# Patient Record
Sex: FEMALE | Race: WHITE | NOT HISPANIC OR LATINO | Employment: OTHER | URBAN - METROPOLITAN AREA
[De-identification: names, ages, dates, MRNs, and addresses within clinical notes are randomized per-mention and may not be internally consistent; named-entity substitution may affect disease eponyms.]

---

## 2019-09-13 ENCOUNTER — OFFICE VISIT (OUTPATIENT)
Dept: URGENT CARE | Facility: CLINIC | Age: 64
End: 2019-09-13
Payer: MEDICARE

## 2019-09-13 VITALS
SYSTOLIC BLOOD PRESSURE: 136 MMHG | OXYGEN SATURATION: 98 % | TEMPERATURE: 97 F | HEIGHT: 69 IN | HEART RATE: 63 BPM | WEIGHT: 154 LBS | BODY MASS INDEX: 22.81 KG/M2 | DIASTOLIC BLOOD PRESSURE: 87 MMHG

## 2019-09-13 DIAGNOSIS — L08.9 INFECTED BLISTER OF GREAT TOE OF LEFT FOOT, INITIAL ENCOUNTER: Primary | ICD-10-CM

## 2019-09-13 DIAGNOSIS — S90.422A INFECTED BLISTER OF GREAT TOE OF LEFT FOOT, INITIAL ENCOUNTER: Primary | ICD-10-CM

## 2019-09-13 PROCEDURE — 99203 PR OFFICE/OUTPT VISIT, NEW, LEVL III, 30-44 MIN: ICD-10-PCS | Mod: S$GLB,,, | Performed by: NURSE PRACTITIONER

## 2019-09-13 PROCEDURE — 99203 OFFICE O/P NEW LOW 30 MIN: CPT | Mod: S$GLB,,, | Performed by: NURSE PRACTITIONER

## 2019-09-13 RX ORDER — SULFAMETHOXAZOLE AND TRIMETHOPRIM 800; 160 MG/1; MG/1
1 TABLET ORAL 2 TIMES DAILY
Qty: 14 TABLET | Refills: 0 | Status: SHIPPED | OUTPATIENT
Start: 2019-09-13 | End: 2019-09-20

## 2019-09-13 RX ORDER — LAMOTRIGINE 100 MG/1
TABLET ORAL
COMMUNITY
Start: 2019-09-03

## 2019-09-13 RX ORDER — CALCIUM CARBONATE 600 MG
600 TABLET ORAL
COMMUNITY

## 2019-09-13 RX ORDER — TRAZODONE HYDROCHLORIDE 100 MG/1
TABLET ORAL
COMMUNITY
Start: 2018-09-07

## 2019-09-13 RX ORDER — MUPIROCIN 20 MG/G
OINTMENT TOPICAL
Qty: 22 G | Refills: 0 | Status: SHIPPED | OUTPATIENT
Start: 2019-09-13 | End: 2019-09-20

## 2019-09-13 RX ORDER — QUETIAPINE FUMARATE 25 MG/1
TABLET, FILM COATED ORAL
COMMUNITY
Start: 2018-05-18

## 2019-09-13 RX ORDER — METFORMIN HYDROCHLORIDE 500 MG/1
1000 TABLET, EXTENDED RELEASE ORAL
COMMUNITY
Start: 2019-04-23

## 2019-09-13 RX ORDER — MULTIVITAMIN
1 TABLET ORAL
COMMUNITY

## 2019-09-13 RX ORDER — ESTRADIOL 2 MG/1
2 TABLET ORAL
COMMUNITY
Start: 2018-02-12

## 2019-09-13 RX ORDER — CLONAZEPAM 1 MG/1
TABLET ORAL
COMMUNITY
Start: 2018-04-11

## 2019-09-13 RX ORDER — ROSUVASTATIN CALCIUM 10 MG/1
10 TABLET, COATED ORAL
COMMUNITY
Start: 2019-05-29

## 2019-09-14 NOTE — PROGRESS NOTES
"Subjective:       Patient ID: Carrie Palomo is a 64 y.o. female.    Vitals:  height is 5' 9" (1.753 m) and weight is 69.9 kg (154 lb). Her oral temperature is 97.3 °F (36.3 °C). Her blood pressure is 136/87 and her pulse is 63. Her oxygen saturation is 98%.     Chief Complaint: Toe Pain    Patient present with left toe swollen and blistering, patient suspected   After working on her feet for 1 week.. Patient noticed swelling Wednesday afternoon. Patient has not been treating toe with nothing.   Patient reports that the blistering has been worsening over the last week.  Patient is an  and she is working on a ranch for 1 week.  Reports that she was on her feet for extended period of time while renovating the ranch.  Denies any trauma to the associated toe.    Toe Pain    The incident occurred 2 days ago. Incident location: Ranch. The injury mechanism is unknown. The pain is present in the left toes. The pain is at a severity of 4/10. The symptoms are aggravated by weight bearing, palpation and movement. She has tried nothing for the symptoms. The treatment provided no relief.       Constitution: Negative for chills, fatigue and fever.   HENT: Negative for congestion and sore throat.    Neck: Negative for painful lymph nodes.   Cardiovascular: Negative for chest pain and leg swelling.   Eyes: Negative for double vision and blurred vision.   Respiratory: Negative for cough and shortness of breath.    Gastrointestinal: Negative for nausea, vomiting and diarrhea.   Genitourinary: Negative for dysuria, frequency, urgency and history of kidney stones.   Musculoskeletal: Positive for joint swelling. Negative for joint pain, muscle cramps and muscle ache.   Skin: Positive for erythema (  Blister present to the distal left greater toe.  Blisters present to the left lateral toe.  The area is mildly tender to touch.  There is surrounding erythema.  There is no blood present in the serum of the blister.). Negative " for color change, pale, rash and bruising.   Allergic/Immunologic: Negative for seasonal allergies.   Neurological: Negative for dizziness, history of vertigo, light-headedness, passing out and headaches.   Hematologic/Lymphatic: Negative for swollen lymph nodes.   Psychiatric/Behavioral: Negative for nervous/anxious, sleep disturbance and depression. The patient is not nervous/anxious.        Objective:      Physical Exam   Constitutional: She is oriented to person, place, and time. She appears well-developed and well-nourished.   HENT:   Head: Normocephalic and atraumatic. Head is without abrasion, without contusion and without laceration.   Right Ear: External ear normal.   Left Ear: External ear normal.   Nose: Nose normal.   Mouth/Throat: Oropharynx is clear and moist.   Eyes: Pupils are equal, round, and reactive to light. Conjunctivae, EOM and lids are normal.   Neck: Trachea normal, full passive range of motion without pain and phonation normal. Neck supple.   Cardiovascular: Normal rate, regular rhythm and normal heart sounds.   Pulmonary/Chest: Effort normal and breath sounds normal. No stridor. No respiratory distress.   Musculoskeletal: Normal range of motion.   Neurological: She is alert and oriented to person, place, and time.   Skin: Skin is warm, dry and intact. Capillary refill takes less than 2 seconds. No abrasion, no bruising, no burn, no ecchymosis, no laceration, no lesion and no rash noted. There is erythema (  Blister present to the distal left greater toe.  Blisters present to the left lateral toe.  The area is mildly tender to touch.  There is surrounding erythema.  There is no blood present in the serum of the blister.).   Psychiatric: She has a normal mood and affect. Her speech is normal and behavior is normal. Judgment and thought content normal. Cognition and memory are normal.   Nursing note and vitals reviewed.        The patient is requesting at the blister be drained in the clinic  today.  The area is cleaned with Betadine as well as alcohol.  Verbal consent was received from the patient.  Using a small 27 gauge sterile needle a small incision is made to the blister.  Serosanguineous drainage is removed.  Patient tolerated well.  There is surrounding erythema to the site.  Concerns of developing infection.    Assessment:       1. Infected blister of great toe of left foot, initial encounter        Plan:         Infected blister of great toe of left foot, initial encounter  -     sulfamethoxazole-trimethoprim 800-160mg (BACTRIM DS) 800-160 mg Tab; Take 1 tablet by mouth 2 (two) times daily. for 7 days  Dispense: 14 tablet; Refill: 0  -     mupirocin (BACTROBAN) 2 % ointment; Apply to affected area 3 times daily  Dispense: 22 g; Refill: 0      Patient Instructions     Blister (Adult)  A blister is a raised area of skin with clear, watery fluid inside. A blister can occur when the skin is damaged. Blisters can hurt when they are pressed, or if they break open.  Blisters can be caused in many ways. This can happen if the skin is rubbed too hard or often. Or they can occur if the skin is hurt by the sun, a virus, or even a medicine.  Most blisters need little treatment. They often dry up and go away in a few days to weeks after the cause is stopped. A blister may need to be cleaned. A broken (open) blister may be bandaged to prevent infection. Blisters caused by insect bites or drug reactions may be more serious. These should be looked at by a healthcare provider.  Home care  Your healthcare provider may prescribe pain medicine. He or she may also prescribe an antibiotic cream or ointment to put on an open blister. Follow all instructions when using these medicines.  General care:  · Follow all instructions on how to care for the blister. If a bandage was put on, change the bandage as instructed. .  · If the blister breaks, the area will leak a clear fluid for a day or 2. Wash the area with soap  and water every day or as advised by your healthcare provider.  · You may use over-the-counter pain medicines to control pain, unless another medicine was prescribed. If you have chronic liver or kidney disease, talk with your healthcare provider before using these medicines. Also talk with your provider if you've had a stomach ulcer or gastrointestinal bleeding.  Follow-up care  Follow up with your healthcare provider, or as advised.  When to seek medical advice  Call your healthcare provider right away if any of these occur:  · Fever of 100.4°F (38°C) or higher  · Redness or swelling that is new or gets worse  · Foul-smelling fluid leaking from the blister  · Pain doesnt go away, or gets worse  · Increase in size of the blister  · Blister doesnt get better after several days  Date Last Reviewed: 9/1/2016  © 7568-9891 [employer]. [address] [place] [address]. All rights reserved. This information is not intended as a substitute for professional medical care. Always follow your healthcare professional's instructions.      -Keep the site clean and covered.  -Warm soaks.  -Antibiotics for the next 7 days.  -Topical antibiotic for the next 5-7 days.  -Follow up with your primary care doctor.  Please follow up with your Primary care provider within 2-5 days if your signs and symptoms have not resolved or worsen.     If your condition worsens or fails to improve we recommend that you receive another evaluation at the emergency room immediately or contact your primary medical clinic to discuss your concerns.   You must understand that you have received an Urgent Care treatment only and that you may be released before all of your medical problems are known or treated. You, the patient, will arrange for follow up care as instructed.

## 2019-09-14 NOTE — PATIENT INSTRUCTIONS
Blister (Adult)  A blister is a raised area of skin with clear, watery fluid inside. A blister can occur when the skin is damaged. Blisters can hurt when they are pressed, or if they break open.  Blisters can be caused in many ways. This can happen if the skin is rubbed too hard or often. Or they can occur if the skin is hurt by the sun, a virus, or even a medicine.  Most blisters need little treatment. They often dry up and go away in a few days to weeks after the cause is stopped. A blister may need to be cleaned. A broken (open) blister may be bandaged to prevent infection. Blisters caused by insect bites or drug reactions may be more serious. These should be looked at by a healthcare provider.  Home care  Your healthcare provider may prescribe pain medicine. He or she may also prescribe an antibiotic cream or ointment to put on an open blister. Follow all instructions when using these medicines.  General care:  · Follow all instructions on how to care for the blister. If a bandage was put on, change the bandage as instructed. .  · If the blister breaks, the area will leak a clear fluid for a day or 2. Wash the area with soap and water every day or as advised by your healthcare provider.  · You may use over-the-counter pain medicines to control pain, unless another medicine was prescribed. If you have chronic liver or kidney disease, talk with your healthcare provider before using these medicines. Also talk with your provider if you've had a stomach ulcer or gastrointestinal bleeding.  Follow-up care  Follow up with your healthcare provider, or as advised.  When to seek medical advice  Call your healthcare provider right away if any of these occur:  · Fever of 100.4°F (38°C) or higher  · Redness or swelling that is new or gets worse  · Foul-smelling fluid leaking from the blister  · Pain doesnt go away, or gets worse  · Increase in size of the blister  · Blister doesnt get better after several days  Date Last  Reviewed: 9/1/2016  © 0441-8483 [employer]. [address] [place] [address], [place] [address]. All rights reserved. This information is not intended as a substitute for professional medical care. Always follow your healthcare professional's instructions.      -Keep the site clean and covered.  -Warm soaks.  -Antibiotics for the next 7 days.  -Topical antibiotic for the next 5-7 days.  -Follow up with your primary care doctor.  Please follow up with your Primary care provider within 2-5 days if your signs and symptoms have not resolved or worsen.     If your condition worsens or fails to improve we recommend that you receive another evaluation at the emergency room immediately or contact your primary medical clinic to discuss your concerns.   You must understand that you have received an Urgent Care treatment only and that you may be released before all of your medical problems are known or treated. You, the patient, will arrange for follow up care as instructed.